# Patient Record
Sex: MALE | Race: WHITE
[De-identification: names, ages, dates, MRNs, and addresses within clinical notes are randomized per-mention and may not be internally consistent; named-entity substitution may affect disease eponyms.]

---

## 2019-12-11 ENCOUNTER — HOSPITAL ENCOUNTER (EMERGENCY)
Dept: HOSPITAL 44 - ED | Age: 39
Discharge: LEFT BEFORE DISCHARGE | End: 2019-12-11
Payer: COMMERCIAL

## 2019-12-11 VITALS — SYSTOLIC BLOOD PRESSURE: 141 MMHG | DIASTOLIC BLOOD PRESSURE: 84 MMHG

## 2019-12-11 DIAGNOSIS — H20.9: Primary | ICD-10-CM

## 2019-12-11 PROCEDURE — 99282 EMERGENCY DEPT VISIT SF MDM: CPT

## 2019-12-11 NOTE — ED PHYSICIAN DOCUMENTATION
General Adult





- HISTORIAN


Historian: patient





- HPI


Stated Complaint: R eye redness


Chief Complaint: General Adult


Onset: days ago (1)


Timing: still present


Severity: moderate


Further Comments: yes (Pt has pain and redness in his R eye.  Sx began 

yesterday. R eye is red, painful to move and to look at light.  Pt is unsure if 

he got something in eye.  He does not now have the feeling of a foreign body in 

his eye.)





- ROS


CONST: no problems


EYES/ENT: other (inflammation R eye, painful to move and to look at light)


CVS/RESP: none


GI/: none


MS/SKIN/LYMPH: none





- PAST HX


Past History: other (appendectomy)


Allergies/Adverse Reactions: 


                                    Allergies











Allergy/AdvReac Type Severity Reaction Status Date / Time


 


No Known Allergies Allergy   Verified 12/11/19 13:31














Home Medications: 


                                Ambulatory Orders











 Medication  Instructions  Recorded


 


NK  12/11/19














- SOCIAL HX


Smoking History: cigarettes





- FAMILY HX


Family History: No





- VITAL SIGNS


Vital Signs: 





                                   Vital Signs











Temp Pulse Resp BP Pulse Ox


 


 98.3 F   91 H  18   141/84   96 


 


 12/11/19 13:18  12/11/19 13:18  12/11/19 13:18  12/11/19 13:18  12/11/19 13:18














- REVIEWED ASSESSMENTS


Nursing Assessment  Reviewed: Yes


Vitals Reviewed: Yes





Progress





- Progress


Progress: 





[facility] Dr. Juliet Balderas





iritis/uveitis











Arrangement made for pt to be transferred to [facility] for evaluation by 

ophthalmologist, Dr. Juliet Balderas.  Pt left without waiting for 

discharge/transfer paperwork.  It is uncertain whether he will f/u as planned.  

Pt left AMA.





General Adult Physical Exam





- PHYSICAL EXAM


GENERAL APPEARANCE: moderate distress


EENT: No: other (R scleral injection; no hyphema; )


NECK: normal inspection, supple


RESPIRATORY: no resp distress, chest non-tender


CVS: reg rate & rhythm, heart sounds normal


SKIN: warm/dry


EXTREMITIES: non-tender, normal range of motion, no evidence of injury


NEURO: oriented X3, motor nml, sensation nml





Discharge


Clincal Impression: 


 iritis/uveitis R eye





Referrals: 


Primary Doctor,No [Primary Care Provider] - 


Condition: Fair


Disposition: 07 AGAINST MEDICAL ADVICE


Decision to Admit: NO


Decision Time: 14:08